# Patient Record
Sex: MALE | Race: WHITE | NOT HISPANIC OR LATINO | URBAN - METROPOLITAN AREA
[De-identification: names, ages, dates, MRNs, and addresses within clinical notes are randomized per-mention and may not be internally consistent; named-entity substitution may affect disease eponyms.]

---

## 2019-05-15 ENCOUNTER — OFFICE (OUTPATIENT)
Dept: URBAN - METROPOLITAN AREA CLINIC 11 | Facility: CLINIC | Age: 64
End: 2019-05-15

## 2019-05-15 VITALS
HEART RATE: 60 BPM | SYSTOLIC BLOOD PRESSURE: 175 MMHG | HEIGHT: 68 IN | WEIGHT: 289 LBS | DIASTOLIC BLOOD PRESSURE: 91 MMHG

## 2019-05-15 DIAGNOSIS — R94.5 ABNORMAL RESULTS OF LIVER FUNCTION STUDIES: ICD-10-CM

## 2019-05-15 DIAGNOSIS — Z86.010 PERSONAL HISTORY OF COLONIC POLYPS: ICD-10-CM

## 2019-05-15 PROCEDURE — 99242 OFF/OP CONSLTJ NEW/EST SF 20: CPT | Performed by: INTERNAL MEDICINE

## 2019-05-15 RX ORDER — POLYETHYLENE GLYCOL 3350, SODIUM SULFATE ANHYDROUS, SODIUM BICARBONATE, SODIUM CHLORIDE, POTASSIUM CHLORIDE 236; 22.74; 6.74; 5.86; 2.97 G/4L; G/4L; G/4L; G/4L; G/4L
POWDER, FOR SOLUTION ORAL
Qty: 4000 | Refills: 0 | Status: COMPLETED
Start: 2019-05-15 | End: 2021-01-14 | Stop reason: CLARIF

## 2019-05-15 NOTE — SERVICENOTES
At this point the patient needs colonoscopy for surveillance purposes and we will move forward with this.  He did have elevated bilirubin on his recent labs of unclear etiology.  He says he has had subsequent labs collected by his primary care provider and did not want lab draw all today.  Therefore we will request records regarding any repeat lab draws and I will see him back in 3 months at which time we can discuss further LFT monitoring. Otherwise I will see the time of the colonoscopy and he is to follow-up in 3 months or earlier if needed.

## 2019-05-15 NOTE — SERVICEHPINOTES
Mr. Kebede is a 64yo M that presents as a referral from Gina Martinez NP as seen in consultation to discuss regarding a personal history of colon polyps and the need for future colonoscopy.  He says he had a colonoscopy approximately 5 years ago where he had a couple of small polyps removed that were precancerous.  Unfortunately he cannot remember the name of the physician or the practice or location where he had this performed. He was told he needed a repeat colonoscopy in 5 years and has been 5 years.  He recently had a CBC checked at his primary care provider's office and had a normal hemoglobin and hematocrit and normal MCV.  His platelet count was slightly low at 128. He had a slightly elevated bilirubin of 1.5 but otherwise had normal AST and ALT.  He denies any significant weight loss or blood in his stool.  He denies any history of heart disease.

## 2019-09-06 ENCOUNTER — OFFICE (OUTPATIENT)
Dept: URBAN - METROPOLITAN AREA CLINIC 11 | Facility: CLINIC | Age: 64
End: 2019-09-06

## 2019-09-14 ENCOUNTER — OFFICE (OUTPATIENT)
Dept: URBAN - METROPOLITAN AREA PATHOLOGY 22 | Facility: PATHOLOGY | Age: 64
End: 2019-09-14

## 2019-09-14 ENCOUNTER — AMBULATORY SURGICAL CENTER (OUTPATIENT)
Dept: URBAN - METROPOLITAN AREA SURGERY 2 | Facility: SURGERY | Age: 64
End: 2019-09-14

## 2019-09-14 VITALS
DIASTOLIC BLOOD PRESSURE: 63 MMHG | HEART RATE: 64 BPM | SYSTOLIC BLOOD PRESSURE: 115 MMHG | OXYGEN SATURATION: 99 % | RESPIRATION RATE: 14 BRPM | HEART RATE: 60 BPM | HEART RATE: 64 BPM | OXYGEN SATURATION: 96 % | SYSTOLIC BLOOD PRESSURE: 114 MMHG | OXYGEN SATURATION: 98 % | SYSTOLIC BLOOD PRESSURE: 120 MMHG | OXYGEN SATURATION: 98 % | RESPIRATION RATE: 16 BRPM | HEART RATE: 55 BPM | OXYGEN SATURATION: 99 % | RESPIRATION RATE: 14 BRPM | OXYGEN SATURATION: 96 % | HEIGHT: 68 IN | RESPIRATION RATE: 14 BRPM | DIASTOLIC BLOOD PRESSURE: 66 MMHG | HEART RATE: 58 BPM | SYSTOLIC BLOOD PRESSURE: 114 MMHG | HEIGHT: 68 IN | HEART RATE: 56 BPM | SYSTOLIC BLOOD PRESSURE: 138 MMHG | SYSTOLIC BLOOD PRESSURE: 138 MMHG | HEIGHT: 68 IN | OXYGEN SATURATION: 98 % | DIASTOLIC BLOOD PRESSURE: 58 MMHG | HEART RATE: 56 BPM | DIASTOLIC BLOOD PRESSURE: 63 MMHG | HEART RATE: 55 BPM | HEART RATE: 58 BPM | OXYGEN SATURATION: 96 % | DIASTOLIC BLOOD PRESSURE: 66 MMHG | HEART RATE: 60 BPM | RESPIRATION RATE: 16 BRPM | HEART RATE: 64 BPM | DIASTOLIC BLOOD PRESSURE: 58 MMHG | TEMPERATURE: 98 F | DIASTOLIC BLOOD PRESSURE: 84 MMHG | WEIGHT: 290 LBS | TEMPERATURE: 98.4 F | HEART RATE: 60 BPM | WEIGHT: 290 LBS | DIASTOLIC BLOOD PRESSURE: 58 MMHG | RESPIRATION RATE: 18 BRPM | OXYGEN SATURATION: 99 % | DIASTOLIC BLOOD PRESSURE: 66 MMHG | DIASTOLIC BLOOD PRESSURE: 54 MMHG | SYSTOLIC BLOOD PRESSURE: 114 MMHG | HEART RATE: 58 BPM | DIASTOLIC BLOOD PRESSURE: 84 MMHG | RESPIRATION RATE: 16 BRPM | SYSTOLIC BLOOD PRESSURE: 120 MMHG | TEMPERATURE: 98 F | HEART RATE: 56 BPM | TEMPERATURE: 98 F | DIASTOLIC BLOOD PRESSURE: 63 MMHG | WEIGHT: 290 LBS | RESPIRATION RATE: 18 BRPM | DIASTOLIC BLOOD PRESSURE: 84 MMHG | SYSTOLIC BLOOD PRESSURE: 115 MMHG | DIASTOLIC BLOOD PRESSURE: 54 MMHG | SYSTOLIC BLOOD PRESSURE: 120 MMHG | TEMPERATURE: 98.4 F | DIASTOLIC BLOOD PRESSURE: 54 MMHG | RESPIRATION RATE: 18 BRPM | HEART RATE: 55 BPM | TEMPERATURE: 98.4 F | SYSTOLIC BLOOD PRESSURE: 115 MMHG | SYSTOLIC BLOOD PRESSURE: 138 MMHG

## 2019-09-14 DIAGNOSIS — K63.5 POLYP OF COLON: ICD-10-CM

## 2019-09-14 DIAGNOSIS — D12.0 BENIGN NEOPLASM OF CECUM: ICD-10-CM

## 2019-09-14 DIAGNOSIS — K57.30 DIVERTICULOSIS OF LARGE INTESTINE WITHOUT PERFORATION OR ABS: ICD-10-CM

## 2019-09-14 DIAGNOSIS — D12.5 BENIGN NEOPLASM OF SIGMOID COLON: ICD-10-CM

## 2019-09-14 DIAGNOSIS — I10 ESSENTIAL (PRIMARY) HYPERTENSION: ICD-10-CM

## 2019-09-14 DIAGNOSIS — D12.2 BENIGN NEOPLASM OF ASCENDING COLON: ICD-10-CM

## 2019-09-14 DIAGNOSIS — Z86.010 PERSONAL HISTORY OF COLONIC POLYPS: ICD-10-CM

## 2019-09-14 PROCEDURE — 45380 COLONOSCOPY AND BIOPSY: CPT | Mod: 59 | Performed by: INTERNAL MEDICINE

## 2019-09-14 PROCEDURE — 88305 TISSUE EXAM BY PATHOLOGIST: CPT | Performed by: INTERNAL MEDICINE

## 2019-09-14 PROCEDURE — 45385 COLONOSCOPY W/LESION REMOVAL: CPT | Mod: 33 | Performed by: INTERNAL MEDICINE

## 2019-09-14 NOTE — SERVICEHPINOTES
Mr. Kebede is a 64yo M that was recently seen as a referral from Gina Martinez NP as seen in consultation to discuss regarding a personal history of colon polyps and the need for future colonoscopy. He says he had a colonoscopy approximately 5 years ago where he had a couple of small polyps removed that were precancerous. Unfortunately he cannot remember the name of the physician or the practice or location where he had this performed. He was told he needed a repeat colonoscopy in 5 years and has been 5 years. He recently had a CBC checked at his primary care provider's office and had a normal hemoglobin and hematocrit and normal MCV. His platelet count was slightly low at 128. He had a slightly elevated bilirubin of 1.5 but otherwise had normal AST and ALT. He denies any significant weight loss or blood in his stool. He denies any history of heart disease. He feels well otherwise. He tolerated the prep. He has no complaints.

## 2019-09-14 NOTE — SERVICEHPINOTES
Mr. Kebede is a 62yo M that was recently seen as a referral from Gina Martinez NP as seen in consultation to discuss regarding a personal history of colon polyps and the need for future colonoscopy. He says he had a colonoscopy approximately 5 years ago where he had a couple of small polyps removed that were precancerous. Unfortunately he cannot remember the name of the physician or the practice or location where he had this performed. He was told he needed a repeat colonoscopy in 5 years and has been 5 years. He recently had a CBC checked at his primary care provider's office and had a normal hemoglobin and hematocrit and normal MCV. His platelet count was slightly low at 128. He had a slightly elevated bilirubin of 1.5 but otherwise had normal AST and ALT. He denies any significant weight loss or blood in his stool. He denies any history of heart disease. He feels well otherwise. He tolerated the prep. He has no complaints.

## 2021-07-21 ENCOUNTER — OFFICE (OUTPATIENT)
Dept: URBAN - METROPOLITAN AREA CLINIC 11 | Facility: CLINIC | Age: 66
End: 2021-07-21

## 2021-07-21 VITALS
HEIGHT: 68 IN | HEART RATE: 55 BPM | DIASTOLIC BLOOD PRESSURE: 81 MMHG | SYSTOLIC BLOOD PRESSURE: 145 MMHG | OXYGEN SATURATION: 96 % | WEIGHT: 298 LBS

## 2021-07-21 DIAGNOSIS — R17 UNSPECIFIED JAUNDICE: ICD-10-CM

## 2021-07-21 DIAGNOSIS — D69.6 THROMBOCYTOPENIA, UNSPECIFIED: ICD-10-CM

## 2021-07-21 DIAGNOSIS — F10.10 ALCOHOL ABUSE, UNCOMPLICATED: ICD-10-CM

## 2021-07-21 LAB
BASIC METABOLIC PANEL (8): BUN/CREATININE RATIO: 15 (ref 10–24)
BASIC METABOLIC PANEL (8): BUN: 14 MG/DL (ref 8–27)
BASIC METABOLIC PANEL (8): CALCIUM: 9.6 MG/DL (ref 8.6–10.2)
BASIC METABOLIC PANEL (8): CARBON DIOXIDE, TOTAL: 25 MMOL/L (ref 20–29)
BASIC METABOLIC PANEL (8): CHLORIDE: 101 MMOL/L (ref 96–106)
BASIC METABOLIC PANEL (8): CREATININE: 0.93 MG/DL (ref 0.76–1.27)
BASIC METABOLIC PANEL (8): EGFR IF AFRICN AM: 98 ML/MIN/1.73 (ref 59–?)
BASIC METABOLIC PANEL (8): EGFR IF NONAFRICN AM: 85 ML/MIN/1.73 (ref 59–?)
BASIC METABOLIC PANEL (8): GLUCOSE: 130 MG/DL — HIGH (ref 65–99)
BASIC METABOLIC PANEL (8): POTASSIUM: 5.5 MMOL/L — HIGH (ref 3.5–5.2)
BASIC METABOLIC PANEL (8): SODIUM: 138 MMOL/L (ref 134–144)
CBC, PLATELET, NO DIFFERENTIAL: HEMATOCRIT: 39.3 % (ref 37.5–51)
CBC, PLATELET, NO DIFFERENTIAL: HEMOGLOBIN: 13 G/DL (ref 13–17.7)
CBC, PLATELET, NO DIFFERENTIAL: MCH: 32.3 PG (ref 26.6–33)
CBC, PLATELET, NO DIFFERENTIAL: MCHC: 33.1 G/DL (ref 31.5–35.7)
CBC, PLATELET, NO DIFFERENTIAL: MCV: 98 FL — HIGH (ref 79–97)
CBC, PLATELET, NO DIFFERENTIAL: PLATELETS: 142 X10E3/UL — LOW (ref 150–450)
CBC, PLATELET, NO DIFFERENTIAL: RBC: 4.02 X10E6/UL — LOW (ref 4.14–5.8)
CBC, PLATELET, NO DIFFERENTIAL: RDW: 12.7 % (ref 11.6–15.4)
CBC, PLATELET, NO DIFFERENTIAL: WBC: 5.4 X10E3/UL (ref 3.4–10.8)
HBSAG SCREEN: NEGATIVE
HCV ANTIBODY: HEP C VIRUS AB: <0.1 S/CO RATIO
HEPATIC FUNCTION PANEL (7): ALBUMIN: 4.3 G/DL (ref 3.8–4.8)
HEPATIC FUNCTION PANEL (7): ALKALINE PHOSPHATASE: 67 IU/L (ref 48–121)
HEPATIC FUNCTION PANEL (7): ALT (SGPT): 28 IU/L (ref 0–44)
HEPATIC FUNCTION PANEL (7): AST (SGOT): 37 IU/L (ref 0–40)
HEPATIC FUNCTION PANEL (7): BILIRUBIN, DIRECT: 0.14 MG/DL (ref 0–0.4)
HEPATIC FUNCTION PANEL (7): BILIRUBIN, TOTAL: 0.4 MG/DL (ref 0–1.2)
HEPATIC FUNCTION PANEL (7): PROTEIN, TOTAL: 7.4 G/DL (ref 6–8.5)
PROTHROMBIN TIME (PT): INR: 1.2 (ref 0.9–1.2)
PROTHROMBIN TIME (PT): PROTHROMBIN TIME: 12.5 SEC — HIGH (ref 9.1–12)

## 2021-07-21 PROCEDURE — 99214 OFFICE O/P EST MOD 30 MIN: CPT | Performed by: INTERNAL MEDICINE

## 2021-08-27 ENCOUNTER — OFFICE (OUTPATIENT)
Dept: URBAN - METROPOLITAN AREA CLINIC 14 | Facility: CLINIC | Age: 66
End: 2021-08-27

## 2021-08-27 VITALS — HEIGHT: 68 IN

## 2021-08-27 DIAGNOSIS — K76.0 FATTY (CHANGE OF) LIVER, NOT ELSEWHERE CLASSIFIED: ICD-10-CM

## 2021-08-27 PROCEDURE — 91200 LIVER ELASTOGRAPHY: CPT | Performed by: INTERNAL MEDICINE
